# Patient Record
Sex: FEMALE | Race: OTHER | HISPANIC OR LATINO | URBAN - METROPOLITAN AREA
[De-identification: names, ages, dates, MRNs, and addresses within clinical notes are randomized per-mention and may not be internally consistent; named-entity substitution may affect disease eponyms.]

---

## 2017-12-14 ENCOUNTER — EMERGENCY (EMERGENCY)
Facility: HOSPITAL | Age: 62
LOS: 1 days | Discharge: HOME | End: 2017-12-14
Attending: EMERGENCY MEDICINE | Admitting: EMERGENCY MEDICINE
Payer: COMMERCIAL

## 2017-12-14 VITALS
DIASTOLIC BLOOD PRESSURE: 80 MMHG | OXYGEN SATURATION: 97 % | HEART RATE: 84 BPM | SYSTOLIC BLOOD PRESSURE: 131 MMHG | TEMPERATURE: 98 F | RESPIRATION RATE: 20 BRPM

## 2017-12-14 VITALS
SYSTOLIC BLOOD PRESSURE: 158 MMHG | HEART RATE: 84 BPM | TEMPERATURE: 98 F | OXYGEN SATURATION: 94 % | RESPIRATION RATE: 18 BRPM | DIASTOLIC BLOOD PRESSURE: 83 MMHG

## 2017-12-14 DIAGNOSIS — J44.9 CHRONIC OBSTRUCTIVE PULMONARY DISEASE, UNSPECIFIED: ICD-10-CM

## 2017-12-14 DIAGNOSIS — Z79.1 LONG TERM (CURRENT) USE OF NON-STEROIDAL ANTI-INFLAMMATORIES (NSAID): ICD-10-CM

## 2017-12-14 DIAGNOSIS — S40.011A CONTUSION OF RIGHT SHOULDER, INITIAL ENCOUNTER: ICD-10-CM
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
## 2017-12-14 DIAGNOSIS — I25.10 ATHEROSCLEROTIC HEART DISEASE OF NATIVE CORONARY ARTERY WITHOUT ANGINA PECTORIS: ICD-10-CM

## 2017-12-14 DIAGNOSIS — Z88.1 ALLERGY STATUS TO OTHER ANTIBIOTIC AGENTS STATUS: ICD-10-CM

## 2017-12-14 DIAGNOSIS — S39.92XA UNSPECIFIED INJURY OF LOWER BACK, INITIAL ENCOUNTER: ICD-10-CM
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
## 2017-12-14 DIAGNOSIS — S20.221A CONTUSION OF RIGHT BACK WALL OF THORAX, INITIAL ENCOUNTER: ICD-10-CM

## 2017-12-14 DIAGNOSIS — Z79.899 OTHER LONG TERM (CURRENT) DRUG THERAPY: ICD-10-CM
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
## 2017-12-14 PROCEDURE — 73523 X-RAY EXAM HIPS BI 5/> VIEWS: CPT | Mod: 26

## 2017-12-14 PROCEDURE — 99284 EMERGENCY DEPT VISIT MOD MDM: CPT

## 2017-12-14 PROCEDURE — 73030 X-RAY EXAM OF SHOULDER: CPT

## 2017-12-14 PROCEDURE — 73030 X-RAY EXAM OF SHOULDER: CPT | Mod: 26,RT

## 2017-12-14 PROCEDURE — 73523 X-RAY EXAM HIPS BI 5/> VIEWS: CPT

## 2017-12-14 RX ORDER — IBUPROFEN 200 MG
600 TABLET ORAL ONCE
Qty: 0 | Refills: 0 | Status: COMPLETED | OUTPATIENT
Start: 2017-12-14 | End: 2017-12-14

## 2017-12-14 RX ADMIN — Medication 600 MILLIGRAM(S): at 11:05

## 2017-12-14 NOTE — ED ADULT TRIAGE NOTE - ARRIVAL INFO ADDITIONAL COMMENTS
Patient denies any head injury, loss of consciousness, numbness/tingling to extremities. C/o pain to lower back, right elbow and right side abdomen.

## 2017-12-14 NOTE — ED ADULT NURSE NOTE - PMH
Anxiety    CAD (coronary artery disease)    COPD (chronic obstructive pulmonary disease)    Insomnia    Osteopenia

## 2017-12-14 NOTE — ED PROVIDER NOTE - MUSCULOSKELETAL, MLM
Spine appears normal, range of motion is not limited, no localized vertebral tenderness to the spinal vertebrae, generaized paraspinal tenderness mostly to the right sided, generalized tenderness over the right hip, NROM to b/l upper and lower extremities, NROM to right shoulder, no deformity, good distal pulses and b/l motor strength to all 4 extremities

## 2017-12-14 NOTE — ED PROVIDER NOTE - MEDICAL DECISION MAKING DETAILS
63 yo female s/p mechanical fall this morning. Ambulatory, in NAD. pt had no head injury or LOC. unlikely fractured any of her extremities or hip/pelvis joints. plan : xray, pain management re-evaluate, anticipate d/c home.

## 2017-12-14 NOTE — ED ADULT NURSE NOTE - CHPI ED SYMPTOMS NEG
no fatigue/no motor function loss/no difficulty bearing weight/no anorexia/no constipation/no bowel dysfunction/no tingling/no neck tenderness/no numbness/no bladder dysfunction

## 2017-12-14 NOTE — ED PROVIDER NOTE - SKIN, MLM
Skin normal color for race, warm, dry and intact. No evidence of rash.  no ecchymosis, hematoma,s abrasions or lacerations

## 2017-12-14 NOTE — ED PROVIDER NOTE - OBJECTIVE STATEMENT
63 yo female in the Er after an accidental fall this morning. Pt reports that she slipped an the stairs because it was wet and fell back. Pt hit her lower and upper back mostly, especially on the right side.  Pt denies head injury or LOC, denies neck pain, weakness or paresthesia to her extremities, denies nausea, vomiting, dizziness, CP or SOB.  Pt was able to ambulate after the accident.

## 2017-12-14 NOTE — ED ADULT NURSE NOTE - OBJECTIVE STATEMENT
Pt reports that she was going down the stairs today and slipped, fell and his her left lower back, left elbow, and right upper arm. Pt denies LOC, is not on any blood thinners. Pt denies numbness, tingling in any extremities, any change in urination or bowel movements.

## 2017-12-14 NOTE — ED PROVIDER NOTE - CARE PLAN
Principal Discharge DX:	Contusion of back wall of thorax, initial encounter  Secondary Diagnosis:	Shoulder contusion

## 2018-01-02 ENCOUNTER — EMERGENCY (EMERGENCY)
Facility: HOSPITAL | Age: 63
LOS: 1 days | Discharge: HOME | End: 2018-01-02
Admitting: EMERGENCY MEDICINE
Payer: COMMERCIAL

## 2018-01-02 VITALS
WEIGHT: 241.85 LBS | HEART RATE: 76 BPM | OXYGEN SATURATION: 97 % | DIASTOLIC BLOOD PRESSURE: 80 MMHG | TEMPERATURE: 97 F | HEIGHT: 66 IN | SYSTOLIC BLOOD PRESSURE: 153 MMHG | RESPIRATION RATE: 18 BRPM

## 2018-01-02 DIAGNOSIS — Z88.1 ALLERGY STATUS TO OTHER ANTIBIOTIC AGENTS STATUS: ICD-10-CM

## 2018-01-02 DIAGNOSIS — M79.671 PAIN IN RIGHT FOOT: ICD-10-CM

## 2018-01-02 DIAGNOSIS — J44.9 CHRONIC OBSTRUCTIVE PULMONARY DISEASE, UNSPECIFIED: ICD-10-CM

## 2018-01-02 DIAGNOSIS — Z79.1 LONG TERM (CURRENT) USE OF NON-STEROIDAL ANTI-INFLAMMATORIES (NSAID): ICD-10-CM

## 2018-01-02 DIAGNOSIS — M72.2 PLANTAR FASCIAL FIBROMATOSIS: ICD-10-CM

## 2018-01-02 DIAGNOSIS — I25.10 ATHEROSCLEROTIC HEART DISEASE OF NATIVE CORONARY ARTERY WITHOUT ANGINA PECTORIS: ICD-10-CM

## 2018-01-02 DIAGNOSIS — Z79.899 OTHER LONG TERM (CURRENT) DRUG THERAPY: ICD-10-CM

## 2018-01-02 PROCEDURE — 99283 EMERGENCY DEPT VISIT LOW MDM: CPT

## 2018-01-02 RX ORDER — IBUPROFEN 200 MG
600 TABLET ORAL ONCE
Qty: 0 | Refills: 0 | Status: COMPLETED | OUTPATIENT
Start: 2018-01-02 | End: 2018-01-02

## 2018-01-02 RX ORDER — DIAZEPAM 5 MG
5 TABLET ORAL ONCE
Qty: 0 | Refills: 0 | Status: DISCONTINUED | OUTPATIENT
Start: 2018-01-02 | End: 2018-01-02

## 2018-01-02 RX ADMIN — Medication 5 MILLIGRAM(S): at 12:27

## 2018-01-02 RX ADMIN — Medication 600 MILLIGRAM(S): at 12:27

## 2018-01-02 RX ADMIN — Medication 600 MILLIGRAM(S): at 13:05

## 2018-01-02 NOTE — ED PROVIDER NOTE - MEDICAL DECISION MAKING DETAILS
63 y/o female with b/l foot and left forearm pain. No findings on physical examination. Pt is pending MRI of left hand. B/l atraumatic foot pain possibly from plantar fasciitis. Pt advised to take ibuprofen/tylenol for pain and to follow up with podiatry.

## 2018-01-02 NOTE — ED PROVIDER NOTE - OBJECTIVE STATEMENT
63 y/o female with b/l foot and left forearm pain x3 weeks. Pt describes a constant "tight" feeling that increases with planter flexion. Pt states that she has been able to walk, however reports pain with ambulation. In addition, pt reports a numbing/tingling sensation to her left 3-5th digit and forearm pain. Pt saw her PMD 3 weeks ago for issue and is currently pending an MRI of the left hand. Pt denies trauma, coldness, skin breaks, swelling.

## 2018-01-02 NOTE — ED PROVIDER NOTE - PHYSICAL EXAMINATION
Foot: no skin changes, sensation, reflex and motor function intact, able to dorsi and plantar flex with good ROM and 2+ pulses.

## 2018-01-02 NOTE — ED ADULT NURSE NOTE - OBJECTIVE STATEMENT
Pt presents to ED c/o bilateral foot pain x3 weeks. Pt reports fall three weeks ago onto R side, was seen at [facility] ED and xr's done at that time. Pt reports since the fall onset of pain to bilateral tops of feet radiating to lateral calves both at rest and while walking currently 9/10. Pt also endorses pain to L forearm, pt states "there's one spot and it's only when I hit up against it or rest it on that spot but when I do it hurts 9/10." Pt denies new injury, numbness/tingling. Pt presents in NAD speaking full sentences ambulatory through triage. Hand  strength equal bilaterally.

## 2018-01-02 NOTE — ED ADULT TRIAGE NOTE - CHIEF COMPLAINT QUOTE
"About 2 weeks ago I fell and I was here and I was checked out but then my left arm started hurting me. I also have pain to both of my feet and for the last 2 days it's been going up my legs."

## 2018-03-20 NOTE — ED ADULT NURSE NOTE - GASTROINTESTINAL WDL
Continue Regimen: Otezla 30mg 1 tab Twice a day \\nMethotrexate 2.5mg 6 tabs once a week\\nRx monitored by ptâs doctor [facility] Action 3: Continue Detail Level: Zone Initiate Treatment: Ketoconazole cream Twice a day Abdomen soft, nontender, nondistended, bowel sounds present in all 4 quadrants.

## 2018-08-26 NOTE — ED ADULT NURSE NOTE - NS ED NURSE DC INFO COMPLEXITY
20/28 Based on this test pt is a moderate risk for fall
Simple: Patient demonstrates quick and easy understanding/Verbalized Understanding

## 2019-06-25 ENCOUNTER — EMERGENCY (EMERGENCY)
Facility: HOSPITAL | Age: 64
LOS: 1 days | Discharge: HOME | End: 2019-06-25
Attending: EMERGENCY MEDICINE | Admitting: EMERGENCY MEDICINE
Payer: MEDICAID

## 2019-06-25 VITALS
RESPIRATION RATE: 16 BRPM | HEIGHT: 66 IN | HEART RATE: 72 BPM | DIASTOLIC BLOOD PRESSURE: 96 MMHG | TEMPERATURE: 98 F | WEIGHT: 222.67 LBS | SYSTOLIC BLOOD PRESSURE: 181 MMHG | OXYGEN SATURATION: 95 %

## 2019-06-25 VITALS
DIASTOLIC BLOOD PRESSURE: 84 MMHG | TEMPERATURE: 98 F | HEART RATE: 66 BPM | OXYGEN SATURATION: 96 % | SYSTOLIC BLOOD PRESSURE: 152 MMHG | RESPIRATION RATE: 19 BRPM

## 2019-06-25 DIAGNOSIS — R42 DIZZINESS AND GIDDINESS: ICD-10-CM

## 2019-06-25 DIAGNOSIS — R51 HEADACHE: ICD-10-CM

## 2019-06-25 LAB
ALBUMIN SERPL ELPH-MCNC: 4.4 G/DL — SIGNIFICANT CHANGE UP (ref 3.3–5)
ALP SERPL-CCNC: 85 U/L — SIGNIFICANT CHANGE UP (ref 40–120)
ALT FLD-CCNC: 17 U/L — SIGNIFICANT CHANGE UP (ref 10–45)
ANION GAP SERPL CALC-SCNC: 13 MMOL/L — SIGNIFICANT CHANGE UP (ref 5–17)
APTT BLD: 31.5 SEC — SIGNIFICANT CHANGE UP (ref 27.5–36.3)
AST SERPL-CCNC: 15 U/L — SIGNIFICANT CHANGE UP (ref 10–40)
BASOPHILS # BLD AUTO: 0.03 K/UL — SIGNIFICANT CHANGE UP (ref 0–0.2)
BASOPHILS NFR BLD AUTO: 0.4 % — SIGNIFICANT CHANGE UP (ref 0–2)
BILIRUB SERPL-MCNC: 0.5 MG/DL — SIGNIFICANT CHANGE UP (ref 0.2–1.2)
BUN SERPL-MCNC: 14 MG/DL — SIGNIFICANT CHANGE UP (ref 7–23)
CALCIUM SERPL-MCNC: 10.1 MG/DL — SIGNIFICANT CHANGE UP (ref 8.4–10.5)
CHLORIDE SERPL-SCNC: 104 MMOL/L — SIGNIFICANT CHANGE UP (ref 96–108)
CO2 SERPL-SCNC: 26 MMOL/L — SIGNIFICANT CHANGE UP (ref 22–31)
CREAT SERPL-MCNC: 0.68 MG/DL — SIGNIFICANT CHANGE UP (ref 0.5–1.3)
EOSINOPHIL # BLD AUTO: 0.05 K/UL — SIGNIFICANT CHANGE UP (ref 0–0.5)
EOSINOPHIL NFR BLD AUTO: 0.7 % — SIGNIFICANT CHANGE UP (ref 0–6)
GLUCOSE SERPL-MCNC: 201 MG/DL — HIGH (ref 70–99)
HCT VFR BLD CALC: 42.3 % — SIGNIFICANT CHANGE UP (ref 34.5–45)
HGB BLD-MCNC: 13.8 G/DL — SIGNIFICANT CHANGE UP (ref 11.5–15.5)
IMM GRANULOCYTES NFR BLD AUTO: 0.4 % — SIGNIFICANT CHANGE UP (ref 0–1.5)
INR BLD: 0.95 — SIGNIFICANT CHANGE UP (ref 0.88–1.16)
LACTATE SERPL-SCNC: 2 MMOL/L — SIGNIFICANT CHANGE UP (ref 0.5–2)
LYMPHOCYTES # BLD AUTO: 1.41 K/UL — SIGNIFICANT CHANGE UP (ref 1–3.3)
LYMPHOCYTES # BLD AUTO: 18.4 % — SIGNIFICANT CHANGE UP (ref 13–44)
MCHC RBC-ENTMCNC: 31.4 PG — SIGNIFICANT CHANGE UP (ref 27–34)
MCHC RBC-ENTMCNC: 32.6 GM/DL — SIGNIFICANT CHANGE UP (ref 32–36)
MCV RBC AUTO: 96.1 FL — SIGNIFICANT CHANGE UP (ref 80–100)
MONOCYTES # BLD AUTO: 0.42 K/UL — SIGNIFICANT CHANGE UP (ref 0–0.9)
MONOCYTES NFR BLD AUTO: 5.5 % — SIGNIFICANT CHANGE UP (ref 2–14)
NEUTROPHILS # BLD AUTO: 5.71 K/UL — SIGNIFICANT CHANGE UP (ref 1.8–7.4)
NEUTROPHILS NFR BLD AUTO: 74.6 % — SIGNIFICANT CHANGE UP (ref 43–77)
NRBC # BLD: 0 /100 WBCS — SIGNIFICANT CHANGE UP (ref 0–0)
PLATELET # BLD AUTO: 164 K/UL — SIGNIFICANT CHANGE UP (ref 150–400)
POTASSIUM SERPL-MCNC: 4 MMOL/L — SIGNIFICANT CHANGE UP (ref 3.5–5.3)
POTASSIUM SERPL-SCNC: 4 MMOL/L — SIGNIFICANT CHANGE UP (ref 3.5–5.3)
PROT SERPL-MCNC: 7.3 G/DL — SIGNIFICANT CHANGE UP (ref 6–8.3)
PROTHROM AB SERPL-ACNC: 10.7 SEC — SIGNIFICANT CHANGE UP (ref 10–12.9)
RBC # BLD: 4.4 M/UL — SIGNIFICANT CHANGE UP (ref 3.8–5.2)
RBC # FLD: 12.8 % — SIGNIFICANT CHANGE UP (ref 10.3–14.5)
SODIUM SERPL-SCNC: 143 MMOL/L — SIGNIFICANT CHANGE UP (ref 135–145)
TROPONIN T SERPL-MCNC: <0.01 NG/ML — SIGNIFICANT CHANGE UP (ref 0–0.01)
WBC # BLD: 7.65 K/UL — SIGNIFICANT CHANGE UP (ref 3.8–10.5)
WBC # FLD AUTO: 7.65 K/UL — SIGNIFICANT CHANGE UP (ref 3.8–10.5)

## 2019-06-25 PROCEDURE — 70450 CT HEAD/BRAIN W/O DYE: CPT | Mod: 26,59

## 2019-06-25 PROCEDURE — 70498 CT ANGIOGRAPHY NECK: CPT

## 2019-06-25 PROCEDURE — 70496 CT ANGIOGRAPHY HEAD: CPT | Mod: 26

## 2019-06-25 PROCEDURE — 83605 ASSAY OF LACTIC ACID: CPT

## 2019-06-25 PROCEDURE — 99291 CRITICAL CARE FIRST HOUR: CPT

## 2019-06-25 PROCEDURE — 85610 PROTHROMBIN TIME: CPT

## 2019-06-25 PROCEDURE — 84484 ASSAY OF TROPONIN QUANT: CPT

## 2019-06-25 PROCEDURE — 96374 THER/PROPH/DIAG INJ IV PUSH: CPT | Mod: XU

## 2019-06-25 PROCEDURE — 99285 EMERGENCY DEPT VISIT HI MDM: CPT

## 2019-06-25 PROCEDURE — 80053 COMPREHEN METABOLIC PANEL: CPT

## 2019-06-25 PROCEDURE — 82962 GLUCOSE BLOOD TEST: CPT

## 2019-06-25 PROCEDURE — 85730 THROMBOPLASTIN TIME PARTIAL: CPT

## 2019-06-25 PROCEDURE — 96375 TX/PRO/DX INJ NEW DRUG ADDON: CPT | Mod: XU

## 2019-06-25 PROCEDURE — 36415 COLL VENOUS BLD VENIPUNCTURE: CPT

## 2019-06-25 PROCEDURE — 93010 ELECTROCARDIOGRAM REPORT: CPT

## 2019-06-25 PROCEDURE — 99291 CRITICAL CARE FIRST HOUR: CPT | Mod: 25

## 2019-06-25 PROCEDURE — 70496 CT ANGIOGRAPHY HEAD: CPT

## 2019-06-25 PROCEDURE — 93005 ELECTROCARDIOGRAM TRACING: CPT

## 2019-06-25 PROCEDURE — 0042T: CPT

## 2019-06-25 PROCEDURE — 70450 CT HEAD/BRAIN W/O DYE: CPT

## 2019-06-25 PROCEDURE — 70498 CT ANGIOGRAPHY NECK: CPT | Mod: 26

## 2019-06-25 PROCEDURE — 85025 COMPLETE CBC W/AUTO DIFF WBC: CPT

## 2019-06-25 RX ORDER — ACETAMINOPHEN 500 MG
1000 TABLET ORAL ONCE
Refills: 0 | Status: COMPLETED | OUTPATIENT
Start: 2019-06-25 | End: 2019-06-25

## 2019-06-25 RX ORDER — METOCLOPRAMIDE HCL 10 MG
10 TABLET ORAL ONCE
Refills: 0 | Status: COMPLETED | OUTPATIENT
Start: 2019-06-25 | End: 2019-06-25

## 2019-06-25 RX ORDER — KETOROLAC TROMETHAMINE 30 MG/ML
30 SYRINGE (ML) INJECTION ONCE
Refills: 0 | Status: DISCONTINUED | OUTPATIENT
Start: 2019-06-25 | End: 2019-06-25

## 2019-06-25 RX ORDER — SODIUM CHLORIDE 9 MG/ML
1000 INJECTION INTRAMUSCULAR; INTRAVENOUS; SUBCUTANEOUS ONCE
Refills: 0 | Status: COMPLETED | OUTPATIENT
Start: 2019-06-25 | End: 2019-06-25

## 2019-06-25 RX ADMIN — Medication 10 MILLIGRAM(S): at 19:12

## 2019-06-25 RX ADMIN — SODIUM CHLORIDE 1000 MILLILITER(S): 9 INJECTION INTRAMUSCULAR; INTRAVENOUS; SUBCUTANEOUS at 19:12

## 2019-06-25 RX ADMIN — Medication 30 MILLIGRAM(S): at 19:45

## 2019-06-25 RX ADMIN — Medication 1000 MILLIGRAM(S): at 19:44

## 2019-06-25 NOTE — CONSULT NOTE ADULT - SUBJECTIVE AND OBJECTIVE BOX
**STROKE CODE CONSULT NOTE**    Last known well time/Time of onset of symptoms: 9 am    HPI:      PAST MEDICAL & SURGICAL HISTORY:  Osteopenia  Insomnia  Anxiety  COPD (chronic obstructive pulmonary disease)  CAD (coronary artery disease)  No significant past surgical history      FAMILY HISTORY:      SOCIAL HISTORY:  Denies smoking, drinking, or drug use    ROS:  Constitutional: No fever, weight loss or fatigue  Eyes: No eye pain, visual disturbances, or discharge  ENMT:  No difficulty hearing, tinnitus, vertigo; No sinus or throat pain  Neck: No pain or stiffness  Respiratory: No cough, wheezing, chills or hemoptysis  Cardiovascular: No chest pain, palpitations, shortness of breath, dizziness or leg swelling  Gastrointestinal: No abdominal pain. No nausea, vomiting or hematemesis; No diarrhea or constipation. Nohematochezia.  Genitourinary: No dysuria, frequency, hematuria or incontinence  Neurological: As per HPI      MEDICATIONS  (STANDING):    MEDICATIONS  (PRN):      Allergies    Cipro (Rash)    Intolerances        Vital Signs Last 24 Hrs  T(C): 36.4 (25 Jun 2019 18:16), Max: 36.4 (25 Jun 2019 18:16)  T(F): 97.5 (25 Jun 2019 18:16), Max: 97.5 (25 Jun 2019 18:16)  HR: 72 (25 Jun 2019 18:16) (72 - 72)  BP: 181/96 (25 Jun 2019 18:16) (181/96 - 181/96)  BP(mean): --  RR: 16 (25 Jun 2019 18:16) (16 - 16)  SpO2: 95% (25 Jun 2019 18:16) (95% - 95%)    PHYSICAL EXAM:  Constitutional: WDWN; NAD    Neurologic:  -Mental status: Awake, alert and oriented to person, place, and time. Speech is fluent with intact naming.  Recent and remote memory intact.  Attention/concentration intact.  No dysarthria, no aphasia.  Fund of knowledge appropriate.    -Cranial nerves:  II: Visual fields full to confrontation. Pupils PERRL  III, IV, VI: extraocular movements are intact without nystagmus  V: Facial sensation intact V1 through V3 intact bilaterally  VII: Face is symmetric with normal eye closure and smile, no facial droop.  VIII: hearing is intact to finger rub  IX, X: uvula is midline and soft palate rises symmetrically  XI: Head turning and shoulder shrug intact  XII: Tongue protrudes in the midline    -Motor:  Normal bulk and tone, strength 5/5 in bilateral upper and lower extremities.   strength 5/5.    -Sensation: Intact to light touch.  No neglect.   -Coordination: No dysmetria on finger-to-nose and heel-to-shin.  No clumsiness.  -Reflexes:downgoing toes bilaterally  -Gait: Narrow and steady. No ataxia.  Romberg negative    NIHSS:    Fingerstick Blood Glucose: CAPILLARY BLOOD GLUCOSE  183 (25 Jun 2019 18:34)      POCT Blood Glucose.: 183 mg/dL (25 Jun 2019 18:17)       LABS:                    RADIOLOGY & ADDITIONAL STUDIES:    IV-tPA (Y/N):            N                         Reason IV-tPA not given: outside window    ASSESSMENT/PLAN:    63y Female w/ PMH coming in with      1)Admit to Stroke unit  -Start Aspirin 81 and Plavix 75  -Start Atorvastatin 80    2) Labs: Obtain Hemoglobin A1c, Lipid profile set, and TSH    3) Other tests:  -MRI  -LETY (NPO after midnight)  -SBP goal 160-200  -PT and OT eval    4)DVT ppx - Heparin SQ and SCDs **STROKE CODE CONSULT NOTE**    Last known well time/Time of onset of symptoms: 9 am    HPI:   63y Female w/ PMH HTN, CAD, one episode of vertigo in the past, coming in with headache and dizziness. Pt states that this morning around 9 am she was out and noticed that she had a headache. The headache worsened in severity throughout the day, and she noted around 2 pm that she also started feeling dizzy, described as lightheaded. She states this is different from her episode of dizziness she had last time. She was walking and had one episode where she felt like she tilted to the left. She also felt nauseous and vomited.   States that she feels like her vision has been more blurry for the past 3 weeks.     In the ER, initial /96.  CT scan negative  During CT, BP repeat was 155/79      PAST MEDICAL & SURGICAL HISTORY:  Osteopenia  Insomnia  Anxiety  COPD (chronic obstructive pulmonary disease)  CAD (coronary artery disease)  No significant past surgical history      FAMILY HISTORY:      SOCIAL HISTORY:  Denies smoking, drinking, or drug use    ROS:  Constitutional: No fever, weight loss or fatigue  Eyes: No eye pain, visual disturbances, or discharge  ENMT:  No difficulty hearing, tinnitus,; No sinus or throat pain  Neck: No pain or stiffness  Respiratory: No cough, wheezing, chills or hemoptysis  Cardiovascular: No chest pain, palpitations, shortness of breath or leg swelling  Gastrointestinal: No abdominal pain. No nausea, vomiting or hematemesis; No diarrhea or constipation. Nohematochezia.  Genitourinary: No dysuria, frequency, hematuria or incontinence  Neurological: As per HPI      MEDICATIONS  (STANDING):    MEDICATIONS  (PRN):      Allergies    Cipro (Rash)    Intolerances        Vital Signs Last 24 Hrs  T(C): 36.4 (25 Jun 2019 18:16), Max: 36.4 (25 Jun 2019 18:16)  T(F): 97.5 (25 Jun 2019 18:16), Max: 97.5 (25 Jun 2019 18:16)  HR: 72 (25 Jun 2019 18:16) (72 - 72)  BP: 181/96 (25 Jun 2019 18:16) (181/96 - 181/96)  BP(mean): --  RR: 16 (25 Jun 2019 18:16) (16 - 16)  SpO2: 95% (25 Jun 2019 18:16) (95% - 95%)    PHYSICAL EXAM:  Constitutional: WDWN; NAD    Neurologic:  -Mental status: Awake, alert and oriented to person, place, and time. Speech is fluent with intact naming.  Recent and remote memory intact.  Attention/concentration intact.  No dysarthria, no aphasia.  Fund of knowledge appropriate.    -Cranial nerves:  II: Visual fields full to confrontation. Pupils PERRL  III, IV, VI: extraocular movements are intact without nystagmus  V: Facial sensation intact V1 through V3 intact bilaterally  VII: Face is symmetric with normal eye closure and smile, no facial droop.  VIII: hearing is intact to finger rub  IX, X: uvula is midline and soft palate rises symmetrically  XI: Head turning and shoulder shrug intact  XII: Tongue protrudes in the midline    -Motor:  Normal bulk and tone, strength 5/5 in bilateral upper and lower extremities.   strength 5/5.    -Sensation: Intact to light touch.  No neglect.   -Coordination: No dysmetria on finger-to-nose and heel-to-shin.  No clumsiness.  -Reflexes:downgoing toes bilaterally  -Gait: Narrow and steady. No ataxia.  Romberg negative    NIHSS: 0    Fingerstick Blood Glucose: CAPILLARY BLOOD GLUCOSE  183 (25 Jun 2019 18:34)      POCT Blood Glucose.: 183 mg/dL (25 Jun 2019 18:17)       LABS:                    RADIOLOGY & ADDITIONAL STUDIES:        IV-tPA (Y/N):            N                         Reason IV-tPA not given: outside window    ASSESSMENT/PLAN:    63y Female w/ PMH HTN, CAD, one episode of vertigo in the past, coming in with headache and dizziness. **STROKE CODE CONSULT NOTE**    Last known well time/Time of onset of symptoms: 9 am    HPI:   63y Female w/ PMH HTN, CAD, one episode of vertigo in the past, coming in with headache and dizziness. Pt states that this morning around 9 am she was out and noticed that she had a headache. The headache worsened in severity throughout the day, and she noted around 2 pm that she also started feeling dizzy, described as lightheaded. She states this is different from her episode of dizziness she had last time. She was walking and had one episode where she felt like she tilted to the left. She also felt nauseous and vomited.   States that she feels like her vision has been more blurry for the past 3 weeks.   Started a new diabetes medication around 3 weeks ago    In the ER, initial /96.  CT scan negative  During CT, BP repeat was 155/79.  After CT, SBP was 186      PAST MEDICAL & SURGICAL HISTORY:  Osteopenia  Insomnia  Anxiety  COPD (chronic obstructive pulmonary disease)  CAD (coronary artery disease)  No significant past surgical history      FAMILY HISTORY:  Mother had CVA in her 60s    SOCIAL HISTORY:  Deniesdrinking, or drug use  Quit smoking 2 years ago    ROS:  Constitutional: No fever, weight loss or fatigue  Eyes: No eye pain, visual disturbances, or discharge  ENMT:  No difficulty hearing, tinnitus,; No sinus or throat pain  Neck: No pain or stiffness  Respiratory: No cough, wheezing, chills or hemoptysis  Cardiovascular: No chest pain, palpitations, shortness of breath or leg swelling  Gastrointestinal: No abdominal pain. No nausea, vomiting or hematemesis; No diarrhea or constipation. Nohematochezia.  Genitourinary: No dysuria, frequency, hematuria or incontinence  Neurological: As per HPI      MEDICATIONS   cholecalciferol  Atorvastatin 40  Ranolazine  Metformin  Calcium  Aspirin  Metoprolol  Gabapentin  Meloxicam  Alprazolam  Clonazepam        Allergies    Cipro (Rash)    Intolerances        Vital Signs Last 24 Hrs  T(C): 36.4 (25 Jun 2019 18:16), Max: 36.4 (25 Jun 2019 18:16)  T(F): 97.5 (25 Jun 2019 18:16), Max: 97.5 (25 Jun 2019 18:16)  HR: 72 (25 Jun 2019 18:16) (72 - 72)  BP: 181/96 (25 Jun 2019 18:16) (181/96 - 181/96)  BP(mean): --  RR: 16 (25 Jun 2019 18:16) (16 - 16)  SpO2: 95% (25 Jun 2019 18:16) (95% - 95%)    PHYSICAL EXAM:  Constitutional: WDWN; NAD    Neurologic:  -Mental status: Awake, alert and oriented to person, place, and time. Speech is fluent with intact naming.  Recent and remote memory intact.  Attention/concentration intact.  No dysarthria, no aphasia.  Fund of knowledge appropriate.    -Cranial nerves:  II: Visual fields full to confrontation. Pupils PERRL  III, IV, VI: extraocular movements are intact without nystagmus  V: Facial sensation intact V1 through V3 intact bilaterally  VII: Face is symmetric with normal eye closure and smile, no facial droop.  VIII: hearing is intact to finger rub  IX, X: uvula is midline and soft palate rises symmetrically  XI: Head turning and shoulder shrug intact  XII: Tongue protrudes in the midline    -Motor:  Normal bulk and tone, strength 5/5 in bilateral upper and lower extremities.   strength 5/5.    -Sensation: Intact to light touch.  No neglect.   -Coordination: No dysmetria on finger-to-nose and heel-to-shin.  No clumsiness.  -Reflexes:downgoing toes bilaterally  -Gait: Narrow and steady. No ataxia.  Romberg negative    NIHSS: 0    Fingerstick Blood Glucose: CAPILLARY BLOOD GLUCOSE  183 (25 Jun 2019 18:34)      POCT Blood Glucose.: 183 mg/dL (25 Jun 2019 18:17)       LABS:                    RADIOLOGY & ADDITIONAL STUDIES:    < from: CT Brain Stroke Protocol (06.25.19 @ 18:37) >    IMPRESSION:   No acute intracranial hemorrhage, mass effect, or CT evidence of recent   transcortical infarction.     < end of copied text >      < from: CT Perfusion w/ Maps w/ IV Cont (06.25.19 @ 18:49) >  IMPRESSION: Negative CT perfusion study.    < end of copied text >      IV-tPA (Y/N):            N                         Reason IV-tPA not given: outside window    ASSESSMENT/PLAN:    63y Female w/ PMH HTN, CAD, one episode of vertigo in the past, coming in with headache and dizziness. **STROKE CODE CONSULT NOTE**    Last known well time/Time of onset of symptoms: 9 am    HPI:   63y Female w/ PMH HTN, CAD, DM (recent A1c 13 per pt), one episode of vertigo in the past, coming in with headache and dizziness. Pt states that this morning around 9 am she was out and noticed that she had a headache. The headache worsened in severity throughout the day, and she noted around 2 pm that she also started feeling dizzy, described as lightheaded. She states this is different from her episode of dizziness she had last time. She was walking and had one episode where she felt like she tilted to the left. She also felt nauseous and vomited.   States that she feels like her vision has been more blurry for the past 3 weeks.   Started a new diabetes medication around 3 weeks ago    In the ER, initial /96.  CT scan negative  During CT, BP repeat was 155/79.  After CT, SBP was 186      PAST MEDICAL & SURGICAL HISTORY:  Osteopenia  Insomnia  Anxiety  COPD (chronic obstructive pulmonary disease)  CAD (coronary artery disease)  No significant past surgical history      FAMILY HISTORY:  Mother had CVA in her 60s    SOCIAL HISTORY:  Deniesdrinking, or drug use  Quit smoking 2 years ago    ROS:  Constitutional: No fever, weight loss or fatigue  Eyes: No eye pain, visual disturbances, or discharge  ENMT:  No difficulty hearing, tinnitus,; No sinus or throat pain  Neck: No pain or stiffness  Respiratory: No cough, wheezing, chills or hemoptysis  Cardiovascular: No chest pain, palpitations, shortness of breath or leg swelling  Gastrointestinal: No abdominal pain. No nausea, vomiting or hematemesis; No diarrhea or constipation. Nohematochezia.  Genitourinary: No dysuria, frequency, hematuria or incontinence  Neurological: As per HPI      MEDICATIONS   cholecalciferol  Atorvastatin 40  Ranolazine  Metformin  Calcium  Aspirin  Metoprolol  Gabapentin  Meloxicam  Alprazolam  Clonazepam        Allergies    Cipro (Rash)    Intolerances        Vital Signs Last 24 Hrs  T(C): 36.4 (25 Jun 2019 18:16), Max: 36.4 (25 Jun 2019 18:16)  T(F): 97.5 (25 Jun 2019 18:16), Max: 97.5 (25 Jun 2019 18:16)  HR: 72 (25 Jun 2019 18:16) (72 - 72)  BP: 181/96 (25 Jun 2019 18:16) (181/96 - 181/96)  BP(mean): --  RR: 16 (25 Jun 2019 18:16) (16 - 16)  SpO2: 95% (25 Jun 2019 18:16) (95% - 95%)    PHYSICAL EXAM:  Constitutional: WDWN; NAD    Neurologic:  -Mental status: Awake, alert and oriented to person, place, and time. Speech is fluent with intact naming.  Recent and remote memory intact.  Attention/concentration intact.  No dysarthria, no aphasia.  Fund of knowledge appropriate.    -Cranial nerves:  II: Visual fields full to confrontation. Pupils PERRL  III, IV, VI: extraocular movements are intact without nystagmus  V: Facial sensation intact V1 through V3 intact bilaterally  VII: Face is symmetric with normal eye closure and smile, no facial droop.  VIII: hearing is intact to finger rub  IX, X: uvula is midline and soft palate rises symmetrically  XI: Head turning and shoulder shrug intact  XII: Tongue protrudes in the midline    -Motor:  Normal bulk and tone, strength 5/5 in bilateral upper and lower extremities.   strength 5/5.    -Sensation: Intact to light touch.  No neglect.   -Coordination: No dysmetria on finger-to-nose and heel-to-shin.  No clumsiness.  -Reflexes:downgoing toes bilaterally  -Gait: Narrow and steady. No ataxia.  Romberg negative    NIHSS: 0    Fingerstick Blood Glucose: CAPILLARY BLOOD GLUCOSE  183 (25 Jun 2019 18:34)      POCT Blood Glucose.: 183 mg/dL (25 Jun 2019 18:17)       LABS:                    RADIOLOGY & ADDITIONAL STUDIES:    < from: CT Brain Stroke Protocol (06.25.19 @ 18:37) >    IMPRESSION:   No acute intracranial hemorrhage, mass effect, or CT evidence of recent   transcortical infarction.     < end of copied text >      < from: CT Perfusion w/ Maps w/ IV Cont (06.25.19 @ 18:49) >  IMPRESSION: Negative CT perfusion study.    < end of copied text >      IV-tPA (Y/N):            N                         Reason IV-tPA not given: outside window    ASSESSMENT/PLAN:    63y Female w/ PMH HTN, CAD, one episode of vertigo in the past, coming in with headache and lightheadedness, BP elevated to 180s.    -CT negative   -Unlikely to be stroke  -Recommend symptomatic headache treatment  -BP control - could consider adding an ACEI to her regimen  -Dispo per ED

## 2019-06-25 NOTE — ED ADULT NURSE REASSESSMENT NOTE - NS ED NURSE REASSESS COMMENT FT1
Rec'd pt calm, a+0 x 4, cooperative, reports headache but states it is improving after treatment. Pt sitting on stretcher conversing with spouse in no distress. Denies n/v at this time. No change in mental status noted. pt condition improving. VSS. Safety precautions in place. Close monitoring continues.

## 2019-06-25 NOTE — CONSULT NOTE ADULT - ATTENDING COMMENTS
Patient seen and examined with PA.  Agree with above.  More probable headache variant or hypertensive urgency.  Doubt TIA/CVA.

## 2019-06-25 NOTE — ED PROVIDER NOTE - NSFOLLOWUPINSTRUCTIONS_ED_ALL_ED_FT
Log Out.    [employer] CareNotes®     :  [facility]             ACUTE HEADACHE - AfterCare(R) Instructions(ER/ED)     Acute Headache    WHAT YOU NEED TO KNOW:    An acute headache is pain or discomfort that starts suddenly and gets worse quickly. You may have an acute headache only when you feel stress or eat certain foods. Other acute headache pain can happen every day, and sometimes several times a day.     DISCHARGE INSTRUCTIONS:    Return to the emergency department if:     You have severe pain.      You have numbness or weakness on one side of your face or body.      You have a headache that occurs after a blow to the head, a fall, or other trauma.       You have a headache, are forgetful or confused, or have trouble speaking.      You have a headache, stiff neck, and a fever.    Contact your healthcare provider if:     You have a constant headache and are vomiting.      You have a headache each day that does not get better, even after treatment.      You have changes in your headaches, or new symptoms that occur when you have a headache.      You have questions or concerns about your condition or care.    Medicines: You may need any of the following:     Prescription pain medicine may be given. The medicine your healthcare provider recommends will depend on the kind of headaches you have. You will need to take prescription headache medicines as directed to prevent a problem called rebound headache. These headaches happen with regular use of pain relievers for headache disorders.      NSAIDs, such as ibuprofen, help decrease swelling, pain, and fever. This medicine is available with or without a doctor's order. NSAIDs can cause stomach bleeding or kidney problems in certain people. If you take blood thinner medicine, always ask your healthcare provider if NSAIDs are safe for you. Always read the medicine label and follow directions.      Acetaminophen decreases pain and fever. It is available without a doctor's order. Ask how much to take and how often to take it. Follow directions. Read the labels of all other medicines you are using to see if they also contain acetaminophen, or ask your doctor or pharmacist. Acetaminophen can cause liver damage if not taken correctly. Do not use more than 3 grams (3,000 milligrams) total of acetaminophen in one day.       Antidepressants may be given for some kinds of headaches.       Take your medicine as directed. Contact your healthcare provider if you think your medicine is not helping or if you have side effects. Tell him or her if you are allergic to any medicine. Keep a list of the medicines, vitamins, and herbs you take. Include the amounts, and when and why you take them. Bring the list or the pill bottles to follow-up visits. Carry your medicine list with you in case of an emergency.    Manage your symptoms:     Apply heat or ice on the headache area. Use a heat or ice pack. For an ice pack, you can also put crushed ice in a plastic bag. Cover the pack or bag with a towel before you apply it to your skin. Ice and heat both help decrease pain, and heat also helps decrease muscle spasms. Apply heat for 20 to 30 minutes every 2 hours. Apply ice for 15 to 20 minutes every hour. Apply heat or ice for as long and for as many days as directed. You may alternate heat and ice.      Relax your muscles. Lie down in a comfortable position and close your eyes. Relax your muscles slowly. Start at your toes and work your way up your body.      Keep a record of your headaches. Write down when your headaches start and stop. Include your symptoms and what you were doing when the headache began. Record what you ate or drank for 24 hours before the headache started. Describe the pain and where it hurts. Keep track of what you did to treat your headache and if it worked.     Prevent an acute headache:     Avoid anything that triggers an acute headache. Examples include exposure to chemicals, going to high altitude, or not getting enough sleep. Create a regular sleep routine. Go to sleep at the same time and wake up at the same time each day. Do not use electronic devices before bedtime. These may trigger a headache or prevent you from sleeping well.      Do not smoke. Nicotine and other chemicals in cigarettes and cigars can trigger an acute headache or make it worse. Ask your healthcare provider for information if you currently smoke and need help to quit. E-cigarettes or smokeless tobacco still contain nicotine. Talk to your healthcare provider before you use these products.       Limit alcohol as directed. Alcohol can trigger an acute headache or make it worse. If you have cluster headaches, do not drink alcohol during an episode. For other types of headaches, ask your healthcare provider if it is safe for you to drink alcohol. Ask how much is safe for you to drink, and how often.      Exercise as directed. Exercise can reduce tension and help with headache pain. Aim for 30 minutes of physical activity on most days of the week. Your healthcare provider can help you create an exercise plan.      Eat a variety of healthy foods. Healthy foods include fruits, vegetables, low-fat dairy products, lean meats, fish, whole grains, and cooked beans. Your healthcare provider or dietitian can help you create meals plans if you need to avoid foods that trigger headaches.    Follow up with your healthcare provider as directed: Bring your headache record with you when you see your healthcare provider. Write down your questions so you remember to ask them during your visits.

## 2019-06-25 NOTE — ED PROVIDER NOTE - CLINICAL SUMMARY MEDICAL DECISION MAKING FREE TEXT BOX
+ stroke code called upon arrival given history/complaints/notable hypertension. Negative workup for CVA/ischemic event or head bleed. HA aborted in ED, BP improving with reduction in HA. Ambulatory in ED, no longer lightheaded/dizzy. As pt is asymptomatic, will d/c home as stroke team has signed off. Has appropriate f/u this week with PMD to discuss changes to HTN regimen. Given return precautions and anticipatory guidance.

## 2019-06-25 NOTE — ED PROVIDER NOTE - OBJECTIVE STATEMENT
63y Female w/ PMH HTN, CAD, one episode of vertigo in the past, coming in with headache and dizziness. Pt states that this morning around 9 am she was out and noticed that she had a headache. The headache worsened in severity throughout the day, and she noted around 2 pm that she also started feeling dizzy, described as lightheaded. She states this is different from her episode of dizziness she had last time. She was walking and had one episode where she felt like she tilted to the left. She also felt nauseous and vomited.   States that she feels like her vision has been more blurry for the past 3 weeks.   Started a new diabetes medication around 3 weeks ago

## 2019-06-25 NOTE — ED PROVIDER NOTE - PHYSICAL EXAMINATION
VITAL SIGNS: I have reviewed nursing notes and confirm.  CONSTITUTIONAL: Well-developed; well-nourished; in no acute distress.  SKIN: Agree with RN documentation regarding decubitus evaluation. Remainder of skin exam is warm and dry, no acute rash.  HEAD: Normocephalic; atraumatic.  EYES: PERRL, EOM intact; conjunctiva and sclera clear.  ENT: No nasal discharge; airway clear.  NECK: Supple; non tender.  CARD: S1, S2 normal; no murmurs, gallops, or rubs. Regular rate and rhythm.  RESP: No wheezes, rales or rhonchi. CTA w/good excursion, no inc wob  ABD: Normal bowel sounds; soft; non-distended; non-tender  EXT: Normal ROM. No clubbing, cyanosis or edema.  NEURO: Alert, oriented. CN 2-12 intact b/l, 5/5 strength all ext, no sensory deficits, speech intact, gait intact, no drift, no dysmetria finger to nose  PSYCH: Cooperative, appropriate.

## 2019-06-25 NOTE — ED ADULT TRIAGE NOTE - CHIEF COMPLAINT QUOTE
pt c/o MEIER that started this morning & sudden onset of dizziness at 2pm associated with vomiting & unsteady gait.

## 2019-06-25 NOTE — ED ADULT NURSE NOTE - NSIMPLEMENTINTERV_GEN_ALL_ED
Implemented All Fall Risk Interventions:  [place] to call system. Call bell, personal items and telephone within reach. Instruct patient to call for assistance. Room bathroom lighting operational. Non-slip footwear when patient is off stretcher. Physically safe environment: no spills, clutter or unnecessary equipment. Stretcher in lowest position, wheels locked, appropriate side rails in place. Provide visual cue, wrist band, yellow gown, etc. Monitor gait and stability. Monitor for mental status changes and reorient to person, place, and time. Review medications for side effects contributing to fall risk. Reinforce activity limits and safety measures with patient and family.

## 2019-06-25 NOTE — ED ADULT NURSE NOTE - OBJECTIVE STATEMENT
Patient is a 62yo female reporting that at around 1100 today, she developed "worst headache of my life" with subsequent nausea and vomiting prior to arrival today. Patient reports momentary unsteadiness when trying to walk. Denies chest pain, shortness of breath abdominal pain, fevers. Neuro intact, states headache is persistent but lightheadedness has subsided.

## 2023-12-13 NOTE — ED ADULT TRIAGE NOTE - CADM TRG TX PRIOR TO ARRIVAL
PT Eval, Low Complexity Minutes (96940): 15       Signing Clinician: Isabella Golden PT      [facility]                                                                                   OUTPATIENT PHYSICAL THERAPY      PLAN OF TREATMENT FOR OUTPATIENT REHABILITATION   Patient's Last Name, First Name, Kaley Watters YOB: 1962   Provider's Name   [facility]   Medical Record No.  [id number]     Onset Date: 08/13/none
